# Patient Record
Sex: MALE | Race: WHITE | NOT HISPANIC OR LATINO | URBAN - METROPOLITAN AREA
[De-identification: names, ages, dates, MRNs, and addresses within clinical notes are randomized per-mention and may not be internally consistent; named-entity substitution may affect disease eponyms.]

---

## 2022-01-04 ENCOUNTER — APPOINTMENT (OUTPATIENT)
Dept: URBAN - METROPOLITAN AREA CLINIC 186 | Age: 22
Setting detail: DERMATOLOGY
End: 2022-01-04

## 2022-01-04 DIAGNOSIS — L71.0 PERIORAL DERMATITIS: ICD-10-CM

## 2022-01-04 PROCEDURE — OTHER EDUCATIONAL RESOURCES PROVIDED: OTHER

## 2022-01-04 PROCEDURE — OTHER DIAGNOSIS COMMENT: OTHER

## 2022-01-04 PROCEDURE — OTHER PRESCRIPTION: OTHER

## 2022-01-04 PROCEDURE — 99203 OFFICE O/P NEW LOW 30 MIN: CPT

## 2022-01-04 PROCEDURE — OTHER PRESCRIPTION MEDICATION MANAGEMENT: OTHER

## 2022-01-04 PROCEDURE — OTHER COUNSELING: OTHER

## 2022-01-04 RX ORDER — DOXYCYCLINE 40 MG/1
CAPSULE ORAL
Qty: 30 | Refills: 3 | Status: CANCELLED | COMMUNITY
Start: 2022-01-04

## 2022-01-04 RX ORDER — METRONIDAZOLE 7.5 MG/G
CREAM TOPICAL
Qty: 45 | Refills: 3 | Status: ERX | COMMUNITY
Start: 2022-01-04

## 2022-01-04 ASSESSMENT — LOCATION ZONE DERM: LOCATION ZONE: LIP

## 2022-01-04 ASSESSMENT — LOCATION SIMPLE DESCRIPTION DERM: LOCATION SIMPLE: RIGHT LIP

## 2022-01-04 ASSESSMENT — LOCATION DETAILED DESCRIPTION DERM: LOCATION DETAILED: RIGHT UPPER CUTANEOUS LIP

## 2022-01-04 NOTE — PROCEDURE: DIAGNOSIS COMMENT
Detail Level: Detailed
Comment: New onset Perioral Dermatitis x several weeks, did admit to use of hydrocortisone x 1 week, however denies use of inhalers or other changes in products.
Render Risk Assessment In Note?: no

## 2022-01-04 NOTE — PROCEDURE: PRESCRIPTION MEDICATION MANAGEMENT
Initiate Treatment: Oracea 40mg once daily \\nMetronidazole twice daily
Plan: Discussed avoiding steroid medications
Samples Given: Oracea
Detail Level: Zone
Render In Strict Bullet Format?: No

## 2022-01-05 ENCOUNTER — RX ONLY (RX ONLY)
Age: 22
End: 2022-01-05

## 2022-01-05 RX ORDER — DOXYCYCLINE HYCLATE 20 MG/1
TABLET, FILM COATED ORAL
Qty: 60 | Refills: 3 | Status: ERX | COMMUNITY
Start: 2022-01-05

## 2022-09-21 ENCOUNTER — APPOINTMENT (OUTPATIENT)
Dept: URBAN - METROPOLITAN AREA CLINIC 186 | Age: 22
Setting detail: DERMATOLOGY
End: 2022-09-21

## 2022-09-21 ENCOUNTER — RX ONLY (RX ONLY)
Age: 22
End: 2022-09-21

## 2022-09-21 DIAGNOSIS — L64.8 OTHER ANDROGENIC ALOPECIA: ICD-10-CM

## 2022-09-21 PROCEDURE — OTHER DIAGNOSIS COMMENT: OTHER

## 2022-09-21 PROCEDURE — OTHER COUNSELING: OTHER

## 2022-09-21 PROCEDURE — OTHER ADDITIONAL NOTES: OTHER

## 2022-09-21 PROCEDURE — 99213 OFFICE O/P EST LOW 20 MIN: CPT

## 2022-09-21 PROCEDURE — OTHER OTC TREATMENT REGIMEN: OTHER

## 2022-09-21 PROCEDURE — OTHER MEDICATION COUNSELING: OTHER

## 2022-09-21 RX ORDER — MINOXIDIL 2.5 MG/1
TABLET ORAL
Qty: 30 | Refills: 3 | Status: ERX | COMMUNITY
Start: 2022-09-21

## 2022-09-21 ASSESSMENT — LOCATION DETAILED DESCRIPTION DERM: LOCATION DETAILED: LEFT SUPERIOR OCCIPITAL SCALP

## 2022-09-21 ASSESSMENT — LOCATION ZONE DERM: LOCATION ZONE: SCALP

## 2022-09-21 ASSESSMENT — LOCATION SIMPLE DESCRIPTION DERM: LOCATION SIMPLE: LEFT OCCIPITAL SCALP

## 2022-09-21 NOTE — PROCEDURE: DIAGNOSIS COMMENT
Comment: father has similar pattern, however paternal grandfather's history is not known.  I discussed minoxidil, oral minoxidil, oral finasteride, revian cap, and prp.  \\nhe will conisder and call with his request.  He is aware follow up in 2 months is needed if he wants oral minoxidil.
Render Risk Assessment In Note?: no
Detail Level: Simple

## 2022-09-21 NOTE — PROCEDURE: ADDITIONAL NOTES
Detail Level: Simple
Render Risk Assessment In Note?: no
Additional Notes: Family history of hair loss - father.\\nCounseled patient on low level light therapy cap vs PRP Injections vs intralesional kenalog vs topical finasteride with minoxidil vs Nutrafol. \\nPatient is interested in oral minoxidil. He is going to think about this before starting, he will call and let us know if he wants to start.

## 2022-09-21 NOTE — PROCEDURE: MEDICATION COUNSELING
Xelwendyz Pregnancy And Lactation Text: This medication is Pregnancy Category D and is not considered safe during pregnancy.  The risk during breast feeding is also uncertain. Xelwnedyz Pregnancy And Lactation Text: This medication is Pregnancy Category D and is not considered safe during pregnancy.  The risk during breast feeding is also uncertain.

## 2022-09-21 NOTE — PROCEDURE: MEDICATION COUNSELING
Mother is calling, said  just called and pt has fever of 101.4 and \"they anticipate it will go higher\" because pt is very warm.   And pt has \"rapsy\" throat. Mother said pt has had an on and off again cough for a couple weeks. Pt also had raspy voice this morning, but mother thought pt slept longer then normal and that is why.     Mother said pt was fine at 7am when he was dropped at  and had no temp then.     Mother is asking if Dr Benítez thinks pt should be seen today or \"wait it out\"   Mother also asking, since Dad has appt today at 130 pm with Dr Benítez for cpx, if he can still keep that appt with pt having fever.     Please advise, thank you    Doxycycline Pregnancy And Lactation Text: This medication is Pregnancy Category D and not consider safe during pregnancy. It is also excreted in breast milk but is considered safe for shorter treatment courses.

## 2022-09-21 NOTE — HPI: HAIR LOSS
How Did The Hair Loss Occur?: gradual in onset
How Severe Is Your Hair Loss?: mild
Additional History: Patient states hair loss started slowly a year ago and recently have gotten worse

## 2023-01-03 ENCOUNTER — RX ONLY (RX ONLY)
Age: 23
End: 2023-01-03

## 2023-01-03 RX ORDER — MINOXIDIL 2.5 MG/1
TABLET ORAL
Qty: 30 | Refills: 2 | Status: ERX

## 2023-05-01 ENCOUNTER — RX ONLY (RX ONLY)
Age: 23
End: 2023-05-01

## 2023-05-01 RX ORDER — MINOXIDIL 2.5 MG/1
TABLET ORAL
Qty: 30 | Refills: 0 | Status: ERX

## 2023-06-12 ENCOUNTER — APPOINTMENT (OUTPATIENT)
Dept: URBAN - METROPOLITAN AREA CLINIC 186 | Age: 23
Setting detail: DERMATOLOGY
End: 2023-06-12

## 2023-06-12 DIAGNOSIS — L64.8 OTHER ANDROGENIC ALOPECIA: ICD-10-CM

## 2023-06-12 PROCEDURE — OTHER PRESCRIPTION: OTHER

## 2023-06-12 PROCEDURE — OTHER COUNSELING: OTHER

## 2023-06-12 PROCEDURE — OTHER ADDITIONAL NOTES: OTHER

## 2023-06-12 PROCEDURE — OTHER PRESCRIPTION MEDICATION MANAGEMENT: OTHER

## 2023-06-12 PROCEDURE — 99214 OFFICE O/P EST MOD 30 MIN: CPT

## 2023-06-12 PROCEDURE — OTHER MEDICATION COUNSELING: OTHER

## 2023-06-12 PROCEDURE — OTHER MIPS QUALITY: OTHER

## 2023-06-12 RX ORDER — MINOXIDIL 2.5 MG/1
TABLET ORAL
Qty: 60 | Refills: 6 | Status: ERX

## 2023-06-12 ASSESSMENT — LOCATION DETAILED DESCRIPTION DERM: LOCATION DETAILED: LEFT SUPERIOR OCCIPITAL SCALP

## 2023-06-12 ASSESSMENT — LOCATION SIMPLE DESCRIPTION DERM: LOCATION SIMPLE: LEFT OCCIPITAL SCALP

## 2023-06-12 ASSESSMENT — LOCATION ZONE DERM: LOCATION ZONE: SCALP

## 2023-06-12 NOTE — PROCEDURE: PRESCRIPTION MEDICATION MANAGEMENT
Detail Level: Zone
Render In Strict Bullet Format?: No
Modify Regimen: Increase minoxidil from 2.5mg to 5mg daily.
Plan: Discussed adding propecia. \\nPatient to take 1 & 1/2 tablets of minoxidil for 3 months then increase to 2 tablets.

## 2023-06-12 NOTE — PROCEDURE: ADDITIONAL NOTES
Detail Level: Simple
Render Risk Assessment In Note?: no
Additional Notes: Family history of hair loss - father.\\nCounseled patient on low level light therapy cap vs PRP Injections vs intralesional kenalog vs topical finasteride with minoxidil vs Nutrafol.
erythema noted under breasts and on chest wall in skin folds only

## 2023-07-03 NOTE — PROCEDURE: MEDICATION COUNSELING
Lab Results   Component Value Date    EGFR 45 06/24/2023    EGFR 43 02/21/2023    EGFR 44 12/19/2022    CREATININE 1.74 (H) 06/24/2023    CREATININE 1.80 (H) 02/21/2023    CREATININE 1.76 (H) 12/19/2022   Present CKD which is likely secondary to recurrence of IgA nephropathy. Stable at this point.   Advise hydration and avoiding nephrotoxic medication Xolair Counseling:  Patient informed of potential adverse effects including but not limited to fever, muscle aches, rash and allergic reactions.  The patient verbalized understanding of the proper use and possible adverse effects of Xolair.  All of the patient's questions and concerns were addressed.

## 2023-12-18 RX ORDER — MINOXIDIL 2.5 MG/1
TABLET ORAL
Qty: 60 | Refills: 6 | Status: CANCELLED
Stop reason: CLARIF

## 2023-12-18 RX ORDER — MINOXIDIL 2.5 MG/1
TABLET ORAL
Qty: 60 | Refills: 0 | Status: ERX

## 2024-03-19 RX ORDER — MINOXIDIL 2.5 MG/1
TABLET ORAL
Qty: 120 | Refills: 0 | Status: ERX
